# Patient Record
Sex: FEMALE | URBAN - METROPOLITAN AREA
[De-identification: names, ages, dates, MRNs, and addresses within clinical notes are randomized per-mention and may not be internally consistent; named-entity substitution may affect disease eponyms.]

---

## 2018-08-28 ENCOUNTER — OFFICE (OUTPATIENT)
Dept: URBAN - METROPOLITAN AREA CLINIC 78 | Facility: CLINIC | Age: 48
End: 2018-08-28
Payer: COMMERCIAL

## 2018-08-28 VITALS
SYSTOLIC BLOOD PRESSURE: 149 MMHG | DIASTOLIC BLOOD PRESSURE: 103 MMHG | HEART RATE: 91 BPM | TEMPERATURE: 97.7 F | HEIGHT: 71 IN | WEIGHT: 185 LBS

## 2018-08-28 DIAGNOSIS — Z80.0 FAMILY HISTORY OF MALIGNANT NEOPLASM OF DIGESTIVE ORGANS: ICD-10-CM

## 2018-08-28 DIAGNOSIS — K62.5 HEMORRHAGE OF ANUS AND RECTUM: ICD-10-CM

## 2018-08-28 DIAGNOSIS — R63.4 ABNORMAL WEIGHT LOSS: ICD-10-CM

## 2018-08-28 PROCEDURE — 99204 OFFICE O/P NEW MOD 45 MIN: CPT

## 2018-08-28 NOTE — SERVICEHPINOTES
PENNY WILKINSON   is a   48   year old    female who is being seen in consultation at the request of   TOM TILLMAN   for f/u. PMHx of hypertension, diabetes, and schizoaffective schizophrenia. She presents today with her sister. She was hospitalized at [facility] for colitis?, rectal bleeding, and leukocytosis. She states she started having rectal bleeding not with a BM and abdominal pain--she went to ER. She states she may have been having diarrhea but unsure if it was just "old blood". She states she was admitted and given 3 different antibiotics. No records of this is available for me to review at this time. She states rectal bleeding has resolved and she has not had any episodes since the end of July (in the hospital). Occasionally has mild lower abdominal pain but states it is much improved compared to before. She finished antibiotics 3 days ago. Has been having regular, once daily BMs, BSS type 4. No blood in the stool. She is taking daily stool softeners. She reports a weight loss of approx. 65 lbs within past couple months, she states she does not eat much (she did weight 248, today she is 185 lbs). Brother had colon cancer, diagnosed around age 58. Her father had polyps and "internal bleeding". No cardiac issues.

## 2018-09-20 ENCOUNTER — ON CAMPUS - OUTPATIENT (OUTPATIENT)
Dept: URBAN - METROPOLITAN AREA HOSPITAL 35 | Facility: HOSPITAL | Age: 48
End: 2018-09-20
Payer: COMMERCIAL

## 2018-09-20 DIAGNOSIS — K62.5 HEMORRHAGE OF ANUS AND RECTUM: ICD-10-CM

## 2018-09-20 PROCEDURE — 45378 DIAGNOSTIC COLONOSCOPY: CPT
